# Patient Record
Sex: FEMALE | Race: WHITE | NOT HISPANIC OR LATINO | Employment: UNEMPLOYED | RURAL
[De-identification: names, ages, dates, MRNs, and addresses within clinical notes are randomized per-mention and may not be internally consistent; named-entity substitution may affect disease eponyms.]

---

## 2020-11-04 ENCOUNTER — OFFICE VISIT (OUTPATIENT)
Dept: FAMILY MEDICINE CLINIC | Facility: CLINIC | Age: 55
End: 2020-11-04

## 2020-11-04 VITALS
HEART RATE: 66 BPM | OXYGEN SATURATION: 99 % | DIASTOLIC BLOOD PRESSURE: 76 MMHG | RESPIRATION RATE: 16 BRPM | BODY MASS INDEX: 37 KG/M2 | HEIGHT: 61 IN | TEMPERATURE: 97.8 F | SYSTOLIC BLOOD PRESSURE: 132 MMHG | WEIGHT: 196 LBS

## 2020-11-04 DIAGNOSIS — Z00.00 WELLNESS EXAMINATION: ICD-10-CM

## 2020-11-04 DIAGNOSIS — M79.7 FIBROMYALGIA: ICD-10-CM

## 2020-11-04 DIAGNOSIS — F32.A DEPRESSION, UNSPECIFIED DEPRESSION TYPE: ICD-10-CM

## 2020-11-04 DIAGNOSIS — I10 ESSENTIAL HYPERTENSION: ICD-10-CM

## 2020-11-04 DIAGNOSIS — E66.9 CLASS 2 OBESITY WITHOUT SERIOUS COMORBIDITY WITH BODY MASS INDEX (BMI) OF 37.0 TO 37.9 IN ADULT, UNSPECIFIED OBESITY TYPE: ICD-10-CM

## 2020-11-04 DIAGNOSIS — M54.9 BACK PAIN, UNSPECIFIED BACK LOCATION, UNSPECIFIED BACK PAIN LATERALITY, UNSPECIFIED CHRONICITY: ICD-10-CM

## 2020-11-04 DIAGNOSIS — R20.0 NUMBNESS: Primary | ICD-10-CM

## 2020-11-04 DIAGNOSIS — Z76.89 ENCOUNTER TO ESTABLISH CARE: ICD-10-CM

## 2020-11-04 DIAGNOSIS — Z87.442 HISTORY OF KIDNEY STONES: ICD-10-CM

## 2020-11-04 DIAGNOSIS — Z12.31 ENCOUNTER FOR SCREENING MAMMOGRAM FOR BREAST CANCER: ICD-10-CM

## 2020-11-04 DIAGNOSIS — G25.81 RESTLESS LEGS: ICD-10-CM

## 2020-11-04 DIAGNOSIS — M54.2 NECK PAIN: ICD-10-CM

## 2020-11-04 PROBLEM — Z01.89 LABORATORY TEST: Status: ACTIVE | Noted: 2020-11-04

## 2020-11-04 PROCEDURE — 99204 OFFICE O/P NEW MOD 45 MIN: CPT | Performed by: FAMILY MEDICINE

## 2020-11-04 RX ORDER — LOSARTAN POTASSIUM 50 MG/1
50 TABLET ORAL DAILY
COMMUNITY
End: 2020-11-19 | Stop reason: SDUPTHER

## 2020-11-04 RX ORDER — FLUOXETINE HYDROCHLORIDE 40 MG/1
40 CAPSULE ORAL DAILY
COMMUNITY
End: 2020-11-10 | Stop reason: SDUPTHER

## 2020-11-04 RX ORDER — TIZANIDINE 2 MG/1
2 TABLET ORAL NIGHTLY PRN
COMMUNITY
End: 2020-11-23

## 2020-11-04 RX ORDER — ROPINIROLE 5 MG/1
5 TABLET, FILM COATED ORAL 2 TIMES DAILY
COMMUNITY
End: 2020-11-10 | Stop reason: SDUPTHER

## 2020-11-04 RX ORDER — LAMOTRIGINE 150 MG/1
150 TABLET ORAL 2 TIMES DAILY
COMMUNITY
End: 2020-11-10 | Stop reason: SDUPTHER

## 2020-11-04 NOTE — PATIENT INSTRUCTIONS
Regular cardio exercise something everyone should consider and try to do; even if health limitations (ie find that exercise UE/LE/cardio that they can tolerate). (as we discussed)  Normal weight a goal for everyone (as we discussed)  Healthy diet helpful for weight management, illness prevention (as we discussed)  If over 50-screening exams include men PSA/rectal exam, women mammograms, and  everyone colonoscopy screening for colon cancer.  If you use tobacco you should stop.      #######################

## 2020-11-05 PROBLEM — R74.8 ELEVATED ALKALINE PHOSPHATASE LEVEL: Status: ACTIVE | Noted: 2020-11-05

## 2020-11-05 LAB
ALBUMIN SERPL-MCNC: 4.4 G/DL (ref 3.8–4.9)
ALBUMIN/GLOB SERPL: 1.7 {RATIO} (ref 1.2–2.2)
ALP SERPL-CCNC: 130 IU/L (ref 39–117)
ALT SERPL-CCNC: 16 IU/L (ref 0–32)
AST SERPL-CCNC: 15 IU/L (ref 0–40)
BASOPHILS # BLD AUTO: 0.1 X10E3/UL (ref 0–0.2)
BASOPHILS NFR BLD AUTO: 1 %
BILIRUB SERPL-MCNC: 0.2 MG/DL (ref 0–1.2)
BUN SERPL-MCNC: 15 MG/DL (ref 6–24)
BUN/CREAT SERPL: 21 (ref 9–23)
CALCIUM SERPL-MCNC: 9.5 MG/DL (ref 8.7–10.2)
CHLORIDE SERPL-SCNC: 106 MMOL/L (ref 96–106)
CO2 SERPL-SCNC: 21 MMOL/L (ref 20–29)
CREAT SERPL-MCNC: 0.7 MG/DL (ref 0.57–1)
EOSINOPHIL # BLD AUTO: 0.2 X10E3/UL (ref 0–0.4)
EOSINOPHIL NFR BLD AUTO: 1 %
ERYTHROCYTE [DISTWIDTH] IN BLOOD BY AUTOMATED COUNT: 13.2 % (ref 11.7–15.4)
ERYTHROCYTE [SEDIMENTATION RATE] IN BLOOD BY WESTERGREN METHOD: 23 MM/HR (ref 0–40)
FOLATE SERPL-MCNC: 8.2 NG/ML
GLOBULIN SER CALC-MCNC: 2.6 G/DL (ref 1.5–4.5)
GLUCOSE SERPL-MCNC: 102 MG/DL (ref 65–99)
HBA1C MFR BLD: 5.8 % (ref 4.8–5.6)
HCT VFR BLD AUTO: 43.9 % (ref 34–46.6)
HGB BLD-MCNC: 15.1 G/DL (ref 11.1–15.9)
IMM GRANULOCYTES # BLD AUTO: 0.1 X10E3/UL (ref 0–0.1)
IMM GRANULOCYTES NFR BLD AUTO: 1 %
IRON SATN MFR SERPL: 25 % (ref 15–55)
IRON SERPL-MCNC: 80 UG/DL (ref 27–159)
LYMPHOCYTES # BLD AUTO: 2.8 X10E3/UL (ref 0.7–3.1)
LYMPHOCYTES NFR BLD AUTO: 23 %
MCH RBC QN AUTO: 30.4 PG (ref 26.6–33)
MCHC RBC AUTO-ENTMCNC: 34.4 G/DL (ref 31.5–35.7)
MCV RBC AUTO: 89 FL (ref 79–97)
MONOCYTES # BLD AUTO: 0.9 X10E3/UL (ref 0.1–0.9)
MONOCYTES NFR BLD AUTO: 7 %
NEUTROPHILS # BLD AUTO: 8.2 X10E3/UL (ref 1.4–7)
NEUTROPHILS NFR BLD AUTO: 67 %
PLATELET # BLD AUTO: 313 X10E3/UL (ref 150–450)
POTASSIUM SERPL-SCNC: 4 MMOL/L (ref 3.5–5.2)
PROT SERPL-MCNC: 7 G/DL (ref 6–8.5)
RBC # BLD AUTO: 4.96 X10E6/UL (ref 3.77–5.28)
SODIUM SERPL-SCNC: 143 MMOL/L (ref 134–144)
TIBC SERPL-MCNC: 316 UG/DL (ref 250–450)
TSH SERPL DL<=0.005 MIU/L-ACNC: 1.74 UIU/ML (ref 0.45–4.5)
UIBC SERPL-MCNC: 236 UG/DL (ref 131–425)
VIT B12 SERPL-MCNC: 674 PG/ML (ref 232–1245)
WBC # BLD AUTO: 12.1 X10E3/UL (ref 3.4–10.8)

## 2020-11-05 NOTE — PROGRESS NOTES
Subjective   Ai Vela is a 55 y.o. female presenting with chief complaint of:   Chief Complaint   Patient presents with   • Establish Care   • Numbness     Left   • Peripheral Neuropathy     Both feet have burning sensations       History of Present Illness :  Alone.  Here for primarily an acute issue today; to establish care.  Has lived in [place] in [place]; was raised in [place].  Has been here about 2 weeks in [place].       Has multiple chronic problems to consider that might have a bearing on today's issues;  an interval appointment.       Chronic/acute problems reviewed today:   1. Numbness within 6 months she has developed numbness of the left thumb and index fingers; this was after recent fall.  Prior to this she had tingling and numbness of all her toes.  No diabetes diagnosis yet.   2. Encounter to establish care initial visit here.   3. Encounter for screening mammogram for breast cancer Chronic/ongoing yearly need to review for breast cancer.  No breast pain, masses, discharge.       4. Essential hypertension Chronic/stable. Stable here past/no recent home blood pressures.  No significant chest pain, SOB, LE edema, orthopnea, near syncope, dizziness/light headness.   Recent Vitals       11/4/2020             BP:  132/76    Pulse:  66    Temp:  97.8 °F (36.6 °C)    Weight:  88.9 kg (196 lb)    BMI (Calculated):  37.1           5. Class 2 obesity without serious comorbidity with body mass index (BMI) of 37.0 to 37.9 in adult, unspecified obesity type she weighed 135 when in high school.  Her last pregnancy she was 200.  High she is ever been is 270 and today she is 196   6. Fibromyalgia previously diagnosed by her family physician is possibly having fibromyalgia.  Has not seen a rheumatologist   7. Neck pain : chronic/variable 1-2/10 neck/UE pain with infrequent/same radiation to UE/LE  No change any numbness.  Has bladder/bowel control. No desire to have surgery/go that far/to change approach of  care.      8. Back pain, unspecified back location, unspecified back pain laterality, unspecified chronicity : chronic/variable 2-3/10 lower back pain with infrequent/same radiation to LE.  No change LE numbness.  Has bladder/bowel control. No desire same to change approach of care.      9. Wellness examination Chronic ongoing over time screening or advice for general health care/wellness.      10. Depression, unspecified depression type chronic/stable without significant  mood swings, down moods, nervousness, difficulty with concentration to function home/work.  Others close have not been concerned.  No suicide ideation/intent.  Rx helps.  Has Bipolar; has f/u with counselor in [employer]      11. Restless legs chronic restlessness of legs: same   12. History of kidney stones chronic stable history of kidney stones with previous ESWL.  No current flank pain hematuria: chronic/past without hematuria/flank pain.      Has an/another acute issue today: none.    The following portions of the patient's history were reviewed and updated as appropriate: allergies, current medications, past family history, past medical history, past social history, past surgical history and problem list.      Current Outpatient Medications:   •  FLUoxetine (PROzac) 40 MG capsule, Take 40 mg by mouth Daily., Disp: , Rfl:   •  lamoTRIgine (LaMICtal) 150 MG tablet, Take 150 mg by mouth 2 (Two) Times a Day., Disp: , Rfl:   •  losartan (COZAAR) 50 MG tablet, Take 50 mg by mouth Daily., Disp: , Rfl:   •  rOPINIRole (REQUIP) 5 MG tablet, Take 5 mg by mouth 2 (two) times a day., Disp: , Rfl:   •  tiZANidine (ZANAFLEX) 2 MG half tablet, Take 2 mg by mouth At Night As Needed for Muscle Spasms., Disp: , Rfl:     No problems with medications.  Refills if needed done    Allergies   Allergen Reactions   • Penicillins Hives       Review of Systems  GENERAL:  Inactive/slower with limits, speed, stamina for age and pains; no regular exercise. Sleep is  ok; occ restless. No fever now/recent.  SKIN: No rash/skin lesion of concern.other than that above  ENDO:  No syncope, near or diaphoretic sweaty spells.  BS Ok past.  HEENT: No recent head injury; or headache.  No vision change. Same not-significant hearing loss.  Ears without pain/drainage.  No sore throat.  No significant nasal/sinus congestion/drainage. No epistaxis.  CHEST: No chest wall tenderness or mass. No significant cough,  without wheeze.  No change occ SOB; no hemoptysis.  CV: No exertional chest pain, palpitations, or ankle edema.  GI: No dysphagia or heartburn.  No abdominal pain, diarrhea, constipation.  No rectal bleeding, or melena.    :  Voids without dysuria; or incontinence to completion.  ORTHO: No painful/swollen joints but various on /off sore.  Daily sore neck or back.  No acute neck or back pain without recent injury.  NEURO: No focal/significant weakness of extremities. Occ dizziness.   Above numbness/paresthesias.   PSYCH: No memory loss.  Mood /variable; occ anxious, depressed but/and not suicidal.  Tries to tolerate stress  Counselor helps.   Screening:  Mammogram: ?/awhile  Bone density: NA  Low dose CT chest: Tobacco-smoker/age 28/dc age 38: (10) NA  GI: none-advised  Prostate: NA  Usual lab order  To establish    Lab Results:  No results found for this or any previous visit.    A1C:No results for input(s): HGBA1C in the last 02470 hours.  PSA:No results for input(s): PSA in the last 65044 hours.  CBC:No results for input(s): WBC, HGB, HCT, PLT, IRONSERUM, IRON in the last 03771 hours.   BMP/CMP:No results for input(s): NA, K, CL, CO2, GLU, BUN, CREATININE, EGFRIFNONA, EGFRIFAFRI, CALCIUM in the last 51066 hours.  HEPATIC:No results for input(s): ALT, AST, ALKPHOS, TOTAL in the last 05437 hours.    Invalid input(s): HEPATITSC  THYROID:No results for input(s): TSH, T3FREE, FTI in the last 06197 hours.    Invalid input(s): T4FREE, T3, T4, TEUP,  TOTALT4    Objective   /76    "Pulse 66   Temp 97.8 °F (36.6 °C) (Temporal)   Resp 16   Ht 154.9 cm (61\")   Wt 88.9 kg (196 lb)   SpO2 99%   BMI 37.03 kg/m²   Body mass index is 37.03 kg/m².    Recent Vitals       11/4/2020             BP:  132/76    Pulse:  66    Temp:  97.8 °F (36.6 °C)    Weight:  88.9 kg (196 lb)    BMI (Calculated):  37.1          Physical Exam  GENERAL:  Well nourished/developed in no acute distress; obese.  SKIN: Turgor excellent, without wound, rash, lesion.  HEENT: Normal cephalic without trauma.  Pupils equal round reactive to light. Extraocular motions full without nystagmus.   External canals nonobstructive nontender without reddness. Tymphatic membranes hitesh with javy structures intact.   Oral cavity without growths, exudates, and moist.  Posterior pharynx without mass, obstruction, redness.  No thyromegaly, mass, tenderness, lymphadenopathy and supple.  CV: Regular rhythm.  No murmur, gallop, edema. Posterior pulses intact.  No carotid bruits.  CHEST: No chest wall tenderness or mass.   LUNGS: Symmetric motion with clear to auscultation.  ABD: Soft, nontender without mass.   ORTHO: Symmetric extremities without swelling/point tenderness.  Full gross range of motion.  NEURO: CN 2-12 grossly intact.  Symmetric facies and UE/LE. 3/5 strength throughout. 1/4 x bicep equal reflexes.  Nonfocal use extremities. Speech clear. Intact light touch with monofilament, vibratory sensation with tuning fork; equal toes/distal feet.    PSYCH: Oriented x 3.  Pleasant calm, well kept.  Purposeful/directed conservation with intact short/long gross memory.     Assessment/Plan     1. Numbness    2. Encounter to establish care    3. Encounter for screening mammogram for breast cancer    4. Essential hypertension    5. Class 2 obesity without serious comorbidity with body mass index (BMI) of 37.0 to 37.9 in adult, unspecified obesity type    6. Fibromyalgia    7. Neck pain    8. Back pain, unspecified back location, unspecified " back pain laterality, unspecified chronicity    9. Wellness examination    10. Depression, unspecified depression type    11. Restless legs    12. History of kidney stones        Discussions:   Start labs  ? EMG/NCV  ? Neuro/surgery    Rx: reviewed/changes:  No orders of the defined types were placed in this encounter.    LAB/Testing/Referrals: reviewed/orders:   Today:   Orders Placed This Encounter   Procedures   • Mammo Screening Digital Tomosynthesis Bilateral With CAD   • Comprehensive metabolic panel   • Hemoglobin A1c   • Vitamin B12   • Folate   • Iron Profile   • Sedimentation Rate   • TSH   • CBC and Differential     Chronic/recurrent labs above or change to:   To establish    Body mass index is 37.03 kg/m².  Patient's Body mass index is 37.03 kg/m². BMI is above normal parameters. Recommendations include: exercise counseling, nutrition counseling and warned of weight.      Tobacco use reviewed:    Ai Vela  reports that she has quit smoking. She has never used smokeless tobacco..       Patient Instructions   Regular cardio exercise something everyone should consider and try to do; even if health limitations (ie find that exercise UE/LE/cardio that they can tolerate). (as we discussed)  Normal weight a goal for everyone (as we discussed)  Healthy diet helpful for weight management, illness prevention (as we discussed)  If over 50-screening exams include men PSA/rectal exam, women mammograms, and  everyone colonoscopy screening for colon cancer.  If you use tobacco you should stop.      #######################        Follow up: Return for lab today/, THEN, will see how testing/or treatment goes and decide;.  No future appointments.    Procedures none

## 2020-11-10 RX ORDER — ROPINIROLE 5 MG/1
5 TABLET, FILM COATED ORAL 2 TIMES DAILY
Qty: 60 TABLET | Refills: 3 | Status: SHIPPED | OUTPATIENT
Start: 2020-11-10 | End: 2020-11-19 | Stop reason: SDUPTHER

## 2020-11-10 RX ORDER — LAMOTRIGINE 150 MG/1
150 TABLET ORAL 2 TIMES DAILY
Qty: 60 TABLET | Refills: 3 | Status: SHIPPED | OUTPATIENT
Start: 2020-11-10

## 2020-11-10 RX ORDER — FLUOXETINE HYDROCHLORIDE 40 MG/1
40 CAPSULE ORAL DAILY
Qty: 30 CAPSULE | Refills: 3 | Status: SHIPPED | OUTPATIENT
Start: 2020-11-10

## 2020-11-10 NOTE — TELEPHONE ENCOUNTER
Caller: Ai Vela    Relationship: Self    Best call back number: [phone]     Medication needed:   Requested Prescriptions     Pending Prescriptions Disp Refills   • FLUoxetine (PROzac) 40 MG capsule        Sig: Take 1 capsule by mouth Daily.   • rOPINIRole (REQUIP) 5 MG tablet        Sig: Take 1 tablet by mouth 2 (two) times a day.   • lamoTRIgine (LaMICtal) 150 MG tablet        Sig: Take 1 tablet by mouth 2 (Two) Times a Day.       When do you need the refill by: 11/10/20    What details did the patient provide when requesting the medication:   PATIENT STATES THAT SHE A FEW DAYS LEFT. ROPINIIROLE IS THE ONE SHE IS OUT OF.     PATIENT WANTS THE NURSE TO GIVE HER A CALL BACK BECAUSE SHE WANTS TO TALK ABOUT SOME LAB WORK THAT  WANTS HER TO HAVE DONE.     Does the patient have less than a 3 day supply:  [x] Yes  [] No    What is the patient's preferred pharmacy: [address] [place] [address] [phone] [place] [phone]

## 2020-11-10 NOTE — TELEPHONE ENCOUNTER
Requested Prescriptions     Pending Prescriptions Disp Refills   • FLUoxetine (PROzac) 40 MG capsule 30 capsule 3     Sig: Take 1 capsule by mouth Daily.   • rOPINIRole (REQUIP) 5 MG tablet 60 tablet 3     Sig: Take 1 tablet by mouth 2 (two) times a day.   • lamoTRIgine (LaMICtal) 150 MG tablet 60 tablet 3     Sig: Take 1 tablet by mouth 2 (Two) Times a Day.

## 2020-11-11 ENCOUNTER — TELEPHONE (OUTPATIENT)
Dept: FAMILY MEDICINE CLINIC | Facility: CLINIC | Age: 55
End: 2020-11-11

## 2020-11-11 NOTE — TELEPHONE ENCOUNTER
PATIENT CALLED AND STATED THAT SHE HAD HAD A COVID TEST DONE ALONG WITH SOME BLOOD WORK. SHE HAS ALREADY GOTTEN THE BLOOD WORK BACK AND SHE WAS TOLD THAT SHE SHOULD HEAR BACK WITH THE COVID TEST THE SAME TIME AS HER BLOOD WORK. PLEASE ADVISE.    NO TESTS FOUND IN CHART.    [phone]

## 2020-11-11 NOTE — TELEPHONE ENCOUNTER
No; where did she say she was swabbed/go?     Do you know anything about a covid test for Ai?   I cannot find one in her chart or OV notes

## 2020-11-19 NOTE — TELEPHONE ENCOUNTER
Caller: Ai Vela    Relationship: Self    Best call back number: [phone]  Medication needed:   Requested Prescriptions     Pending Prescriptions Disp Refills   • rOPINIRole (REQUIP) 5 MG tablet 60 tablet 3     Sig: Take 1 tablet by mouth 2 (two) times a day.   • losartan (COZAAR) 50 MG tablet        Sig: Take 1 tablet by mouth Daily.       When do you need the refill by: ASAP    What details did the patient provide when requesting the medication:  PATIENT ALSO NEEDS tiZANidine BUT IT WAS PRESCRIBED BY PREVIOUS PROVIDER.     Does the patient have less than a 3 day supply:  [x] Yes  [] No    What is the patient's preferred pharmacy: [facility] [address] [place] [address] [phone] PH - [phone]

## 2020-11-20 ENCOUNTER — TELEPHONE (OUTPATIENT)
Dept: FAMILY MEDICINE CLINIC | Facility: CLINIC | Age: 55
End: 2020-11-20

## 2020-11-20 RX ORDER — LOSARTAN POTASSIUM 50 MG/1
50 TABLET ORAL DAILY
Qty: 30 TABLET | Refills: 5 | Status: SHIPPED | OUTPATIENT
Start: 2020-11-20

## 2020-11-20 RX ORDER — TIZANIDINE 2 MG/1
2 TABLET ORAL NIGHTLY PRN
Qty: 30 TABLET | Refills: 5 | Status: SHIPPED | OUTPATIENT
Start: 2020-11-20 | End: 2020-11-20 | Stop reason: SDUPTHER

## 2020-11-20 RX ORDER — ROPINIROLE 5 MG/1
5 TABLET, FILM COATED ORAL 2 TIMES DAILY
Qty: 60 TABLET | Refills: 3 | Status: SHIPPED | OUTPATIENT
Start: 2020-11-20

## 2020-11-20 NOTE — TELEPHONE ENCOUNTER
Requested Prescriptions     Pending Prescriptions Disp Refills   • rOPINIRole (REQUIP) 5 MG tablet 60 tablet 3     Sig: Take 1 tablet by mouth 2 (two) times a day.   • losartan (COZAAR) 50 MG tablet 30 tablet 5     Sig: Take 1 tablet by mouth Daily.

## 2020-11-20 NOTE — TELEPHONE ENCOUNTER
PATIENT REQUESTING HER tiZANidine (ZANAFLEX) 2 MG tablet BE SENT TO [facility].     CONFIRMED PHARMACY: [facility] [address] [place] [address] [phone] [facility] [phone]   [phone]

## 2020-11-20 NOTE — TELEPHONE ENCOUNTER
Requested Prescriptions     Pending Prescriptions Disp Refills   • tiZANidine (ZANAFLEX) 2 MG tablet 30 tablet 5     Sig: Take 1 tablet by mouth At Night As Needed for Muscle Spasms.

## 2020-11-23 RX ORDER — TIZANIDINE 2 MG/1
2 TABLET ORAL NIGHTLY PRN
Qty: 30 TABLET | Refills: 5 | Status: SHIPPED | OUTPATIENT
Start: 2020-11-23

## 2020-11-23 NOTE — TELEPHONE ENCOUNTER
Requested Prescriptions     Pending Prescriptions Disp Refills   • tiZANidine (ZANAFLEX) 2 MG tablet 30 tablet 5     Sig: Take 1 tablet by mouth At Night As Needed for Muscle Spasms.     Looks like this went to the wrong pharmacy.

## 2020-11-23 NOTE — TELEPHONE ENCOUNTER
PATIENT CALLING IN STATING THAT HER tiZANidine (ZANAFLEX) 2 MG tablet STILL HAS NOT BEEN CALLED IN, AND SHE WILL RUN OUT TODAY 11/23.     CONFIRMED PHARMACY: [facility] [address] [place] [address] [phone] [facility] [phone]   [phone]

## 2020-12-01 ENCOUNTER — HOSPITAL ENCOUNTER (OUTPATIENT)
Dept: MAMMOGRAPHY | Facility: HOSPITAL | Age: 55
Discharge: HOME | End: 2020-12-01
Admitting: FAMILY MEDICINE

## 2020-12-01 DIAGNOSIS — Z12.31 ENCOUNTER FOR SCREENING MAMMOGRAM FOR BREAST CANCER: ICD-10-CM

## 2020-12-01 DIAGNOSIS — R92.8 ABNORMAL MAMMOGRAM OF LEFT BREAST: Primary | ICD-10-CM

## 2020-12-01 PROCEDURE — 77063 BREAST TOMOSYNTHESIS BI: CPT

## 2020-12-01 PROCEDURE — 77067 SCR MAMMO BI INCL CAD: CPT

## 2020-12-11 ENCOUNTER — OFFICE VISIT (OUTPATIENT)
Dept: INTERNAL MEDICINE | Facility: CLINIC | Age: 55
End: 2020-12-11

## 2020-12-11 ENCOUNTER — RESULTS ENCOUNTER (OUTPATIENT)
Dept: INTERNAL MEDICINE | Facility: CLINIC | Age: 55
End: 2020-12-11

## 2020-12-11 VITALS
TEMPERATURE: 98.2 F | SYSTOLIC BLOOD PRESSURE: 130 MMHG | HEART RATE: 82 BPM | RESPIRATION RATE: 16 BRPM | BODY MASS INDEX: 36.53 KG/M2 | OXYGEN SATURATION: 98 % | DIASTOLIC BLOOD PRESSURE: 84 MMHG | WEIGHT: 193.5 LBS | HEIGHT: 61 IN

## 2020-12-11 DIAGNOSIS — E66.01 CLASS 2 SEVERE OBESITY DUE TO EXCESS CALORIES WITH SERIOUS COMORBIDITY AND BODY MASS INDEX (BMI) OF 36.0 TO 36.9 IN ADULT (HCC): ICD-10-CM

## 2020-12-11 DIAGNOSIS — G89.29 CHRONIC NECK PAIN: ICD-10-CM

## 2020-12-11 DIAGNOSIS — F31.81 BIPOLAR 2 DISORDER (HCC): ICD-10-CM

## 2020-12-11 DIAGNOSIS — Z12.12 SCREENING FOR COLORECTAL CANCER: ICD-10-CM

## 2020-12-11 DIAGNOSIS — R30.0 DYSURIA: Primary | ICD-10-CM

## 2020-12-11 DIAGNOSIS — I10 ESSENTIAL HYPERTENSION: ICD-10-CM

## 2020-12-11 DIAGNOSIS — R20.0 NUMBNESS: ICD-10-CM

## 2020-12-11 DIAGNOSIS — G25.81 RESTLESS LEGS: ICD-10-CM

## 2020-12-11 DIAGNOSIS — Z12.11 SCREENING FOR COLORECTAL CANCER: ICD-10-CM

## 2020-12-11 DIAGNOSIS — R74.8 ELEVATED ALKALINE PHOSPHATASE LEVEL: ICD-10-CM

## 2020-12-11 DIAGNOSIS — M54.2 CHRONIC NECK PAIN: ICD-10-CM

## 2020-12-11 PROBLEM — Z01.89 LABORATORY TEST: Status: RESOLVED | Noted: 2020-11-04 | Resolved: 2020-12-11

## 2020-12-11 PROBLEM — Z00.00 WELLNESS EXAMINATION: Status: RESOLVED | Noted: 2020-11-04 | Resolved: 2020-12-11

## 2020-12-11 PROCEDURE — 99203 OFFICE O/P NEW LOW 30 MIN: CPT | Performed by: INTERNAL MEDICINE

## 2020-12-11 RX ORDER — LURASIDONE HYDROCHLORIDE 20 MG/1
1 TABLET, FILM COATED ORAL DAILY
COMMUNITY
Start: 2020-11-25 | End: 2021-01-15

## 2020-12-11 NOTE — PROGRESS NOTES
CC: establish care for neck and back pain    History:  Ai Vela is a 55 y.o. female who presents today for evaluation of the above problems.      She notes she has been doing reasonably well and recently moved here from [place], [place].  She has been having pain in her right gluteal area and low back as well as her neck that worsened after a fall in August.  She has transient numbness in her pincer fingers on her left hand, but also has numbness in her left second and third toe.  However, she denies numbness of a whole hand or foot any location.  She is working with a chiropractor.    She has bipolar disorder and is currently on Lamictal, Latuda, and Prozac.  She is fearful she will have to change her medication given her insurance coverage is preference not to pay for Latuda.  However, she is working with [facility] to sort out the details for that.    She has restless legs, but is on high-dose ropinirole.  She was previously told this was the max dose, but she feels it has worked less in the recent past.    Her blood pressure has been well controlled on current medication without any side effects.      ROS:  Review of Systems   Constitutional: Negative for chills and fever.   HENT: Negative for congestion and sore throat.    Eyes: Negative for visual disturbance.   Respiratory: Negative for cough and shortness of breath.    Cardiovascular: Negative for chest pain and palpitations.   Gastrointestinal: Negative for abdominal pain, constipation and nausea.   Endocrine: Negative for cold intolerance and heat intolerance.   Genitourinary: Negative for difficulty urinating and frequency.   Musculoskeletal: Positive for arthralgias, back pain, myalgias, neck pain and neck stiffness.   Skin: Negative for rash.   Neurological: Positive for numbness. Negative for dizziness, weakness and headaches.   Psychiatric/Behavioral: Negative for dysphoric mood. The patient is not nervous/anxious.        Allergies   Allergen  "Reactions   • Penicillins Hives     Past Medical History:   Diagnosis Date   • Anxiety    • Arthritis    • Bipolar 1 disorder (CMS/HCC)    • Fibromyalgia    • Hypertension    • Kidney stone    • Restless legs      Past Surgical History:   Procedure Laterality Date   •  SECTION     • CHOLECYSTECTOMY     • HYSTERECTOMY     • TUBAL ABDOMINAL LIGATION       Family History   Problem Relation Age of Onset   • Other Mother    • Dementia Father    • Diabetes Sister    • Breast cancer Neg Hx       reports that she has quit smoking. She has never used smokeless tobacco. She reports current alcohol use. She reports that she does not use drugs.      Current Outpatient Medications:   •  FLUoxetine (PROzac) 40 MG capsule, Take 1 capsule by mouth Daily., Disp: 30 capsule, Rfl: 3  •  lamoTRIgine (LaMICtal) 150 MG tablet, Take 1 tablet by mouth 2 (Two) Times a Day., Disp: 60 tablet, Rfl: 3  •  Latuda 20 MG tablet tablet, Take 1 tablet by mouth Daily., Disp: , Rfl:   •  losartan (COZAAR) 50 MG tablet, Take 1 tablet by mouth Daily., Disp: 30 tablet, Rfl: 5  •  rOPINIRole (REQUIP) 5 MG tablet, Take 1 tablet by mouth 2 (two) times a day., Disp: 60 tablet, Rfl: 3  •  tiZANidine (ZANAFLEX) 2 MG tablet, Take 1 tablet by mouth At Night As Needed for Muscle Spasms., Disp: 30 tablet, Rfl: 5    OBJECTIVE:  /84 (BP Location: Left arm, Patient Position: Sitting, Cuff Size: Adult)   Pulse 82   Temp 98.2 °F (36.8 °C)   Resp 16   Ht 154.9 cm (61\")   Wt 87.8 kg (193 lb 8 oz)   SpO2 98%   Breastfeeding No   BMI 36.56 kg/m²    Physical Exam  Constitutional:       General: She is not in acute distress.     Appearance: She is well-developed.   HENT:      Head: Normocephalic and atraumatic.      Right Ear: External ear normal.      Left Ear: External ear normal.   Eyes:      General: No scleral icterus.     Extraocular Movements: Extraocular movements intact.   Neck:      Musculoskeletal: Normal range of motion and neck supple.      " Trachea: No tracheal deviation.   Cardiovascular:      Rate and Rhythm: Normal rate and regular rhythm.      Heart sounds: Normal heart sounds. No murmur.   Pulmonary:      Effort: Pulmonary effort is normal. No accessory muscle usage or respiratory distress.      Breath sounds: Normal breath sounds. No wheezing.   Abdominal:      General: There is no distension.      Palpations: Abdomen is soft.      Tenderness: There is no abdominal tenderness.   Musculoskeletal: Normal range of motion.      Right lower leg: No edema.      Left lower leg: No edema.   Skin:     General: Skin is warm and dry.      Nails: There is no clubbing.     Neurological:      Mental Status: She is alert and oriented to person, place, and time.      Coordination: Coordination normal.      Gait: Gait normal.   Psychiatric:         Mood and Affect: Mood normal. Mood is not anxious or depressed.         Behavior: Behavior normal.         Assessment/Plan     Diagnoses and all orders for this visit:    1. Dysuria (Primary)  -     Urinalysis With Culture If Indicated - Urine, Clean Catch; Future  Check urinalysis to evaluate for UTI.    2. Screening for colorectal cancer  -     Cologuard - Stool, Per Rectum; Future    3. Chronic neck pain  5. Numbness  This does seem to have a musculoskeletal etiology as it does not involve all of her fingers or toes diffusely.  This argues against an exposure or diabetic neuropathy and also against medication effect.  Recommend routine stretching and exercise and could consider physical therapy if needed.    4. Essential hypertension  Well controlled, BP goal for age is <140/90 per JNC 8 guidelines and continue current medications    6. Restless legs  On ropinirole.  Consider change to alternative such as Mirapex, but she does not want to get up to control she currently has at this time.    7. Class 2 severe obesity due to excess calories with serious comorbidity and body mass index (BMI) of 36.0 to 36.9 in adult  (CMS/[facility])  Recommended attention to portion control and being careful about the types and timing of meals for the purpose of weight management.    8. Elevated alkaline phosphatase level  -     Comprehensive metabolic panel; Future  -     Hepatitis C Antibody; Future  Recheck CMP and hepatitis C antibody for screening.    9. Bipolar 2 disorder (CMS/HCC)  She is on current medications per [facility].  Consider alternative such as duloxetine to assist with chronic musculoskeletal pain, possible fibromyalgia, and neuropathic symptoms, but she prefers not to adjust this currently while they are sorting out her Latuda.       An After Visit Summary was printed and given to the patient at discharge.  Return in about 6 weeks (around 1/22/2021) for Annual physical with PAP with Jack[facility].         Naun Calderon D.O. 12/11/2020   Electronically signed.

## 2020-12-15 ENCOUNTER — TELEPHONE (OUTPATIENT)
Dept: INTERNAL MEDICINE | Facility: CLINIC | Age: 55
End: 2020-12-15

## 2020-12-15 NOTE — TELEPHONE ENCOUNTER
Patient was wondering if you can prior auth the removal of her skin tags down below    She would like it done on her 1-22-21  Annual exam      Please call her     [phone]

## 2020-12-16 ENCOUNTER — HOSPITAL ENCOUNTER (OUTPATIENT)
Dept: MAMMOGRAPHY | Facility: HOSPITAL | Age: 55
Discharge: HOME | End: 2020-12-16

## 2020-12-16 ENCOUNTER — HOSPITAL ENCOUNTER (OUTPATIENT)
Dept: ULTRASOUND IMAGING | Facility: HOSPITAL | Age: 55
Discharge: HOME | End: 2020-12-16

## 2020-12-16 ENCOUNTER — LAB (OUTPATIENT)
Dept: LAB | Facility: HOSPITAL | Age: 55
End: 2020-12-16

## 2020-12-16 DIAGNOSIS — R92.8 ABNORMAL MAMMOGRAM OF LEFT BREAST: ICD-10-CM

## 2020-12-16 DIAGNOSIS — R30.0 DYSURIA: ICD-10-CM

## 2020-12-16 DIAGNOSIS — R74.8 ELEVATED ALKALINE PHOSPHATASE LEVEL: ICD-10-CM

## 2020-12-16 LAB
ALBUMIN SERPL-MCNC: 4.3 G/DL (ref 3.5–5.2)
ALBUMIN/GLOB SERPL: 1.4 G/DL
ALP SERPL-CCNC: 131 U/L (ref 39–117)
ALT SERPL W P-5'-P-CCNC: 17 U/L (ref 1–33)
ANION GAP SERPL CALCULATED.3IONS-SCNC: 8 MMOL/L (ref 5–15)
AST SERPL-CCNC: 12 U/L (ref 1–32)
BILIRUB SERPL-MCNC: 0.2 MG/DL (ref 0–1.2)
BUN SERPL-MCNC: 20 MG/DL (ref 6–20)
BUN/CREAT SERPL: 31.7 (ref 7–25)
CALCIUM SPEC-SCNC: 9.3 MG/DL (ref 8.6–10.5)
CHLORIDE SERPL-SCNC: 107 MMOL/L (ref 98–107)
CO2 SERPL-SCNC: 27 MMOL/L (ref 22–29)
CREAT SERPL-MCNC: 0.63 MG/DL (ref 0.57–1)
GFR SERPL CREATININE-BSD FRML MDRD: 97 ML/MIN/1.73
GLOBULIN UR ELPH-MCNC: 3 GM/DL
GLUCOSE SERPL-MCNC: 94 MG/DL (ref 65–99)
POTASSIUM SERPL-SCNC: 3.9 MMOL/L (ref 3.5–5.2)
PROT SERPL-MCNC: 7.3 G/DL (ref 6–8.5)
SODIUM SERPL-SCNC: 142 MMOL/L (ref 136–145)

## 2020-12-16 PROCEDURE — 77065 DX MAMMO INCL CAD UNI: CPT

## 2020-12-16 PROCEDURE — 80053 COMPREHEN METABOLIC PANEL: CPT

## 2020-12-16 PROCEDURE — G0279 TOMOSYNTHESIS, MAMMO: HCPCS

## 2020-12-16 PROCEDURE — 76642 ULTRASOUND BREAST LIMITED: CPT

## 2020-12-16 PROCEDURE — 81003 URINALYSIS AUTO W/O SCOPE: CPT

## 2020-12-16 PROCEDURE — 36415 COLL VENOUS BLD VENIPUNCTURE: CPT

## 2020-12-16 PROCEDURE — 86803 HEPATITIS C AB TEST: CPT

## 2020-12-17 LAB
BILIRUB UR QL STRIP: NEGATIVE
CLARITY UR: ABNORMAL
COLOR UR: YELLOW
GLUCOSE UR STRIP-MCNC: NEGATIVE MG/DL
HCV AB SER DONR QL: NORMAL
HGB UR QL STRIP.AUTO: NEGATIVE
KETONES UR QL STRIP: NEGATIVE
LEUKOCYTE ESTERASE UR QL STRIP.AUTO: NEGATIVE
NITRITE UR QL STRIP: NEGATIVE
PH UR STRIP.AUTO: 5.5 [PH] (ref 5–8)
PROT UR QL STRIP: NEGATIVE
SP GR UR STRIP: >=1.03 (ref 1–1.03)
UROBILINOGEN UR QL STRIP: ABNORMAL

## 2020-12-23 ENCOUNTER — TELEPHONE (OUTPATIENT)
Dept: INTERNAL MEDICINE | Facility: CLINIC | Age: 55
End: 2020-12-23

## 2020-12-23 NOTE — TELEPHONE ENCOUNTER
Patient called and stated that she would like an update on the col,oguard that was ordered. She has not heard anything back from provider. Please advise if she can come get it done.    Callback: [phone]

## 2020-12-29 NOTE — TELEPHONE ENCOUNTER
Patient stated she never received the Cologuard test.  [facility] and was told they did not receive the order.  [facility].      I tried calling to inform patient, no answer.  I left a message for her to call the office.

## 2021-01-07 ENCOUNTER — OFFICE VISIT (OUTPATIENT)
Dept: INTERNAL MEDICINE | Facility: CLINIC | Age: 56
End: 2021-01-07

## 2021-01-07 VITALS
OXYGEN SATURATION: 97 % | DIASTOLIC BLOOD PRESSURE: 72 MMHG | RESPIRATION RATE: 16 BRPM | WEIGHT: 199 LBS | TEMPERATURE: 96.8 F | HEIGHT: 61 IN | SYSTOLIC BLOOD PRESSURE: 104 MMHG | HEART RATE: 86 BPM | BODY MASS INDEX: 37.57 KG/M2

## 2021-01-07 DIAGNOSIS — A63.0 GENITAL WARTS: ICD-10-CM

## 2021-01-07 DIAGNOSIS — R10.11 RIGHT UPPER QUADRANT ABDOMINAL PAIN: ICD-10-CM

## 2021-01-07 DIAGNOSIS — Z12.72 SCREENING FOR VAGINAL CANCER: ICD-10-CM

## 2021-01-07 DIAGNOSIS — R10.2 PELVIC PAIN: ICD-10-CM

## 2021-01-07 DIAGNOSIS — I10 ESSENTIAL HYPERTENSION: ICD-10-CM

## 2021-01-07 DIAGNOSIS — F31.81 BIPOLAR 2 DISORDER (HCC): ICD-10-CM

## 2021-01-07 DIAGNOSIS — Z00.01 ENCOUNTER FOR ANNUAL GENERAL MEDICAL EXAMINATION WITH ABNORMAL FINDINGS IN ADULT: Primary | ICD-10-CM

## 2021-01-07 DIAGNOSIS — N84.2 VAGINAL POLYP: ICD-10-CM

## 2021-01-07 DIAGNOSIS — Z01.411 ABNORMAL FEMALE PELVIC EXAM: ICD-10-CM

## 2021-01-07 PROCEDURE — 87512 GARDNER VAG DNA QUANT: CPT | Performed by: NURSE PRACTITIONER

## 2021-01-07 PROCEDURE — 87798 DETECT AGENT NOS DNA AMP: CPT | Performed by: NURSE PRACTITIONER

## 2021-01-07 PROCEDURE — 87661 TRICHOMONAS VAGINALIS AMPLIF: CPT | Performed by: NURSE PRACTITIONER

## 2021-01-07 PROCEDURE — 87481 CANDIDA DNA AMP PROBE: CPT | Performed by: NURSE PRACTITIONER

## 2021-01-07 PROCEDURE — 87563 M. GENITALIUM AMP PROBE: CPT | Performed by: NURSE PRACTITIONER

## 2021-01-07 PROCEDURE — G0123 SCREEN CERV/VAG THIN LAYER: HCPCS | Performed by: NURSE PRACTITIONER

## 2021-01-07 PROCEDURE — 87624 HPV HI-RISK TYP POOLED RSLT: CPT | Performed by: NURSE PRACTITIONER

## 2021-01-07 PROCEDURE — 99396 PREV VISIT EST AGE 40-64: CPT | Performed by: NURSE PRACTITIONER

## 2021-01-07 NOTE — PROGRESS NOTES
CC: annual exam   Vaginal skin tag    History:  Ai Vela is a 55 y.o. female who presents today for follow-up for evaluation of the above:  Answers for HPI/ROS submitted by the patient on 1/3/2021   Female genitourinary complaint  What is the primary reason for your visit?: Vaginal Discharge/Irritation  genital itching: No  genital lesions: No  genital odor: Yes  genital rash: No  missed menses: No  vaginal bleeding: No  Chronicity: chronic  Onset: more than 1 year ago  Frequency: constantly  Progression since onset: waxing and waning  Severity of pain: moderate  Affected side: both  Pregnant now?: No  anorexia: No  discolored urine: No  joint pain: Yes  joint swelling: No  painful intercourse: No  Aggravated by: tactile pressure, urinating  Sexual activity: not sexually active  Partner with STD symptoms: no  Birth control: hysterectomy  Menstrual history: postmenopausal  Discharge characteristics: brown, mucoid  Passing clots?: No  Passing tissue?: No      Patient presents today for annual exam with c/o pelvic fullness and upper abdominal pain. She is s/p cholecystectomy. S/p partial hysterectomy. No recent pap and she is unsure of her last results.   She reports skin tags in her vaginal area that she would like removed.   Patient reports compliance with blood pressure medications without adverse side effects.         ROS:  Review of Systems   Constitutional: Negative for chills and fever.   HENT: Negative for sore throat.    Gastrointestinal: Positive for abdominal pain, constipation and diarrhea. Negative for nausea and vomiting.   Genitourinary: Positive for frequency, pelvic pain, urgency and vaginal discharge. Negative for dysuria, flank pain and hematuria.   Musculoskeletal: Negative for back pain.   Skin: Negative for rash.   Neurological: Negative for headaches.       Ms. Vela  reports that she has quit smoking. She has never used smokeless tobacco. She reports current alcohol use. She reports  "that she does not use drugs.      Current Outpatient Medications:   •  FLUoxetine (PROzac) 40 MG capsule, Take 1 capsule by mouth Daily., Disp: 30 capsule, Rfl: 3  •  lamoTRIgine (LaMICtal) 150 MG tablet, Take 1 tablet by mouth 2 (Two) Times a Day., Disp: 60 tablet, Rfl: 3  •  Latuda 20 MG tablet tablet, Take 1 tablet by mouth Daily., Disp: , Rfl:   •  losartan (COZAAR) 50 MG tablet, Take 1 tablet by mouth Daily., Disp: 30 tablet, Rfl: 5  •  rOPINIRole (REQUIP) 5 MG tablet, Take 1 tablet by mouth 2 (two) times a day., Disp: 60 tablet, Rfl: 3  •  tiZANidine (ZANAFLEX) 2 MG tablet, Take 1 tablet by mouth At Night As Needed for Muscle Spasms., Disp: 30 tablet, Rfl: 5      OBJECTIVE:  /72 (BP Location: Left arm, Patient Position: Sitting, Cuff Size: Large Adult)   Pulse 86   Temp 96.8 °F (36 °C) (Temporal)   Resp 16   Ht 154.9 cm (61\")   Wt 90.3 kg (199 lb)   SpO2 97%   BMI 37.60 kg/m²    Physical Exam  Vitals signs reviewed.   Constitutional:       General: She is not in acute distress.     Appearance: She is well-developed.   HENT:      Head: Normocephalic and atraumatic.   Eyes:      Pupils: Pupils are equal, round, and reactive to light.   Neck:      Musculoskeletal: Normal range of motion and neck supple.   Cardiovascular:      Rate and Rhythm: Normal rate and regular rhythm.      Heart sounds: Normal heart sounds.   Pulmonary:      Effort: Pulmonary effort is normal. No respiratory distress.      Breath sounds: Normal breath sounds.   Abdominal:      General: Bowel sounds are normal.      Palpations: Abdomen is soft.   Genitourinary:     Labia:         Right: Lesion present.         Left: Lesion present.           Comments: Large polyp appearing lesion in vaginal cuff line. Pap obtained. Non friable.   Musculoskeletal: Normal range of motion.   Skin:     General: Skin is warm and dry.   Neurological:      Mental Status: She is alert and oriented to person, place, and time. "         Assessment/Plan    Diagnoses and all orders for this visit:    1. Encounter for annual general medical examination with abnormal findings in adult (Primary)  Immunizations:      - Tetanus: Unknown or >10 years ago. Recommend to have at pharmacy or on injury.      - Influenza: recommended annually      - Pneumovax:once after age 65      - Prevnar: Once after age 65      - Zostavax: Once after age 60  Colonoscopy: Due at 50  Mammogram: Due at 40.  PAP: at age 19  DEXA: DEXA scan at 63    2. Essential hypertension  Well controlled, BP goal for age is <140/90 per JNC 8 guidelines and continue current medications    3. Bipolar 2 disorder (CMS/[facility])  I advised that it will take 3-4 weeks to see some effect and 6-8 weeks to see full effect.  If the dose is ineffective or suboptimal, the patient should notify the clinic for a consideration of a dose increase.  The patient was advised that starting this medication could worsen symptoms of SI/HI. We discussed this as an emergency and reason to seek care immediately. The patient expressed understanding.     4. Right upper quadrant abdominal pain  -     US Abdomen Complete; Future    5. Pelvic pain  -     US Abdomen Complete; Future  -     Ambulatory Referral to Obstetrics / Gynecology    6. Screening for vaginal cancer  -     Liquid-based Pap Smear, Screening; Future    7. Genital warts  -     Ambulatory Referral to Obstetrics / Gynecology    8. Abnormal female pelvic exam  -     Ambulatory Referral to Obstetrics / Gynecology    9. Vaginal polyp  -     Ambulatory Referral to Obstetrics / Gynecology    Other orders  -     Cancel: Gynecologic Fluid, Supplemental Testing; Future    Patient [place] plans to [place] three [place]. It will take some time for her to reestablish care and with findings today will consult Ob/gyn for recommendation on condyloma treatment. Pap pending.      An After Visit Summary was printed and given to the patient at discharge.  No  follow-ups on file. Sooner if problems arise.          Jacki Arias APRN. 1/7/2021   Electronically Signed

## 2021-01-13 LAB
HPV I/H RISK 4 DNA CVX QL PROBE+SIG AMP: NOT DETECTED
TRICHOMONAS VAGINALIS PCR: NOT DETECTED

## 2021-01-15 ENCOUNTER — OFFICE VISIT (OUTPATIENT)
Dept: OBSTETRICS AND GYNECOLOGY | Facility: CLINIC | Age: 56
End: 2021-01-15

## 2021-01-15 VITALS
DIASTOLIC BLOOD PRESSURE: 72 MMHG | SYSTOLIC BLOOD PRESSURE: 108 MMHG | WEIGHT: 196 LBS | HEIGHT: 61 IN | BODY MASS INDEX: 37 KG/M2

## 2021-01-15 DIAGNOSIS — A63.0 CONDYLOMA ACUMINATUM IN FEMALE: Primary | ICD-10-CM

## 2021-01-15 DIAGNOSIS — Z78.9 NON-SMOKER: ICD-10-CM

## 2021-01-15 PROCEDURE — 99203 OFFICE O/P NEW LOW 30 MIN: CPT | Performed by: OBSTETRICS & GYNECOLOGY

## 2021-01-15 RX ORDER — ARIPIPRAZOLE 5 MG/1
5 TABLET ORAL DAILY
COMMUNITY
Start: 2021-01-12

## 2021-01-15 NOTE — PROGRESS NOTES
"Chief Complaint  Pelvic Pain (pt here for gyn u/s for pelvic pain and vaginal polyp, last pap normal 01/07/2021 with Jacki Arias)    Subjective          Ai Vela presents to [facility] for   Patient presents today with a quotation shea vaginal polyp\"  It occasionally bothers her with pain  It occasionally bleeds  She has no other gynecologic complaints  This is been present for several years    Pelvic ultrasound is reviewed  Left ovary is not visualized but right ovary is normal.  There is no free fluid.  She is status post hysterectomy.      Objective   Vital Signs:   /72 (BP Location: Left arm, Patient Position: Sitting, Cuff Size: Large Adult)   Ht 154.9 cm (61\")   Wt 88.9 kg (196 lb)   BMI 37.03 kg/m²     Physical Exam  Vitals signs and nursing note reviewed. Exam conducted with a chaperone present.   Constitutional:       Appearance: Normal appearance. She is obese.   HENT:      Head: Normocephalic and atraumatic.   Neck:      Musculoskeletal: Normal range of motion and neck supple.   Cardiovascular:      Rate and Rhythm: Normal rate and regular rhythm.      Pulses: Normal pulses.      Heart sounds: Normal heart sounds.   Pulmonary:      Effort: Pulmonary effort is normal.      Breath sounds: Normal breath sounds.   Abdominal:      General: Abdomen is flat. Bowel sounds are normal.      Palpations: Abdomen is soft.      Hernia: There is no hernia in the left inguinal area or right inguinal area.   Genitourinary:     Exam position: Lithotomy position.      Pubic Area: No rash or pubic lice.       Labia:         Right: Lesion present. No rash, tenderness or injury.         Left: Lesion present. No rash, tenderness or injury.        Lymphadenopathy:      Lower Body: No right inguinal adenopathy. No left inguinal adenopathy.   Skin:     General: Skin is warm and dry.   Neurological:      General: No focal deficit present.      Mental Status: She is alert and oriented to " person, place, and time. Mental status is at baseline.   Psychiatric:         Mood and Affect: Mood normal.         Behavior: Behavior normal.         Thought Content: Thought content normal.         Judgment: Judgment normal.        Result Review :                 Assessment and Plan    Problem List Items Addressed This Visit     None      Visit Diagnoses     Condyloma acuminatum in female    -  Primary    Non-smoker              Follow Up   Return if symptoms worsen or fail to improve.  Patient was given instructions and counseling regarding her condition or for health maintenance advice. Please see specific information pulled into the AVS if appropriate.     Would recommend excisional biopsy of the area for diagnostic purposes as well as therapeutic  This would need to be done in the operating room but she is planning to move within the next 1 to 2 weeks  If she changes her mind and stays locally, patient will follow back up for scheduling    Narciso Simms MD

## 2021-01-18 ENCOUNTER — HOSPITAL ENCOUNTER (OUTPATIENT)
Dept: ULTRASOUND IMAGING | Facility: HOSPITAL | Age: 56
Discharge: HOME | End: 2021-01-18
Admitting: NURSE PRACTITIONER

## 2021-01-18 DIAGNOSIS — R10.11 RIGHT UPPER QUADRANT ABDOMINAL PAIN: ICD-10-CM

## 2021-01-18 DIAGNOSIS — R10.2 PELVIC PAIN: ICD-10-CM

## 2021-01-18 LAB
GEN CATEG CVX/VAG CYTO-IMP: NORMAL
LAB AP CASE REPORT: NORMAL
LAB AP GYN ADDITIONAL INFORMATION: NORMAL
PATH INTERP SPEC-IMP: NORMAL
STAT OF ADQ CVX/VAG CYTO-IMP: NORMAL

## 2021-01-18 PROCEDURE — 76700 US EXAM ABDOM COMPLETE: CPT
